# Patient Record
(demographics unavailable — no encounter records)

---

## 2025-01-14 NOTE — ASSESSMENT
[FreeTextEntry1] : Pt is a 42 year old male with infertility  labs reviewed  no pserm low test and high fsh and lh   will repeat semen analysis  scrotal sono

## 2025-01-14 NOTE — HISTORY OF PRESENT ILLNESS
[FreeTextEntry1] : 01/14/2025 cc Pt is a 42 year old male presenting for a new patient visit with c/o azoospermia. Pt reports having trouble impregnating his partner. Pt reports that 6 months ago he had a semen analysis that found zero sperm cells in his ejaculate. Since then pt has not tried to treat this. Pt reports his erections and urination is fine.   Testosterone 85  Free testosterone 2.7 LH 27  FSH 48  no past surgeries

## 2025-01-14 NOTE — END OF VISIT
[FreeTextEntry4] : This note was written by Benito Mcadams on 01/14/2025 actively solely Milton Brewer M.D. I, Benito Mcadams, am scribing for and in the presence of Milton Brewer M.D. in the following sections HISTORY OF PRESENT ILLNESS, PAST MEDICAL/FAMILY/SOCIAL HISTORY; REVIEW OF SYSTEMS; VITAL SIGNS; PHYSICAL EXAM; ASSESSMENT/PLAN. All medical record entries made by this scribe at , Milton Brewer M.D. direction and personally dictated by me on 01/14/2025. I personally performed the services described in the documentation, reviewed the documentation recorded by the scribe in my presence, and it accurately and completely records my words and actions.

## 2025-01-14 NOTE — PHYSICAL EXAM
[Normal Appearance] : normal appearance [Well Groomed] : well groomed [General Appearance - In No Acute Distress] : no acute distress [Edema] : no peripheral edema [Respiration, Rhythm And Depth] : normal respiratory rhythm and effort [Exaggerated Use Of Accessory Muscles For Inspiration] : no accessory muscle use [Abdomen Soft] : soft [Abdomen Tenderness] : non-tender [Costovertebral Angle Tenderness] : no ~M costovertebral angle tenderness [Urinary Bladder Findings] : the bladder was normal on palpation [Normal Station and Gait] : the gait and station were normal for the patient's age [] : no rash [No Focal Deficits] : no focal deficits [Oriented To Time, Place, And Person] : oriented to person, place, and time [Affect] : the affect was normal [Mood] : the mood was normal [No Palpable Adenopathy] : no palpable adenopathy [de-identified] : geovanna atrophic testicles

## 2025-07-21 NOTE — ASSESSMENT
[FreeTextEntry1] : Azoospermia: discussed likely will need semen retrieval procedure will do a trial of clomid f/u with repeat semen analysis, bloodwork and scrotal US

## 2025-07-21 NOTE — HISTORY OF PRESENT ILLNESS
[FreeTextEntry1] : 44yo M, Italian speaking, presents to discuss infertility He had azoospermia previously in [facility] semen analysis 7/2024 He was advised about IVF in past Previously had high LH, FSH and low testosterone

## 2025-07-21 NOTE — PHYSICAL EXAM
[Normal Appearance] : normal appearance [Well Groomed] : well groomed [General Appearance - In No Acute Distress] : no acute distress [Edema] : no peripheral edema [Respiration, Rhythm And Depth] : normal respiratory rhythm and effort [] : no rash [No Focal Deficits] : no focal deficits [Oriented To Time, Place, And Person] : oriented to person, place, and time [Affect] : the affect was normal [Mood] : the mood was normal [No Palpable Adenopathy] : no palpable adenopathy